# Patient Record
(demographics unavailable — no encounter records)

---

## 2025-01-23 NOTE — PLAN
[FreeTextEntry5] : -counseling and support provided. -pt to continue with IT, pt has intake at school today for biweekly therapy. 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 - continue concerta 27mg po daily. pt will call for refill -er/911 prn -f/u 3 months

## 2025-01-23 NOTE — REASON FOR VISIT
[Telehealth (audio & video) - Individual/Group] : This visit was provided via telehealth using real-time 2-way audio visual technology. [Medical Office: ([facility])___] : The provider was located at the medical office in [unfilled]. [Home] : The patient, [unfilled], was located at home, [unfilled], at the time of the visit. [Participant(s) identity verified] : Participant(s) identity verified. [Verbal consent obtained from patient/other participant(s)] : Verbal consent for telehealth/telephonic services obtained from patient/other participant(s) [Patient] : Patient [FreeTextEntry1] : anxiety

## 2025-01-23 NOTE — FAMILY HISTORY
[FreeTextEntry1] : mom -dx with bipolar Depakote and Zyprexa (for the last 12 years). Pt did not do well on Prozac or Wellbutrin. \par  maternal mom - staton\par  maternal half brother - takes something, not sure\par  maternal half sister - pregnant not on anything now\par  \par  No family history of suicide.

## 2025-05-01 NOTE — REASON FOR VISIT
[Participant(s) identity verified] : Participant(s) identity verified. [Verbal consent obtained from patient/other participant(s)] : Verbal consent for telehealth/telephonic services obtained from patient/other participant(s) [Patient] : Patient [FreeTextEntry1] : anxiety

## 2025-05-01 NOTE — PLAN
[FreeTextEntry5] : -counseling and support provided. 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 - continue concerta 27mg po daily. pt will call for refill -er/911 prn -f/u 3 months

## 2025-07-24 NOTE — PLAN
[FreeTextEntry5] :  -counseling and support provided. 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 - continue concerta 27mg po daily. pt does not take this anymore -er/911 prn -f/u 3 months